# Patient Record
Sex: MALE | Race: WHITE
[De-identification: names, ages, dates, MRNs, and addresses within clinical notes are randomized per-mention and may not be internally consistent; named-entity substitution may affect disease eponyms.]

---

## 2018-03-29 ENCOUNTER — HOSPITAL ENCOUNTER (EMERGENCY)
Dept: HOSPITAL 17 - NEPK | Age: 23
Discharge: HOME | End: 2018-03-29
Payer: COMMERCIAL

## 2018-03-29 VITALS — HEIGHT: 68 IN | BODY MASS INDEX: 23.39 KG/M2 | WEIGHT: 154.32 LBS

## 2018-03-29 VITALS
SYSTOLIC BLOOD PRESSURE: 133 MMHG | DIASTOLIC BLOOD PRESSURE: 65 MMHG | RESPIRATION RATE: 15 BRPM | HEART RATE: 76 BPM | OXYGEN SATURATION: 100 % | TEMPERATURE: 98.1 F

## 2018-03-29 DIAGNOSIS — S59.911A: Primary | ICD-10-CM
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
## 2018-03-29 PROCEDURE — 73090 X-RAY EXAM OF FOREARM: CPT

## 2018-03-29 PROCEDURE — 99283 EMERGENCY DEPT VISIT LOW MDM: CPT

## 2018-03-29 NOTE — RADRPT
EXAM DATE/TIME:  03/29/2018 10:09 

 

[facility] COMPARISON:     

No previous studies available for comparison.

 

                     

INDICATIONS :     

Fall. Right lateral arm pain.

                     

 

MEDICAL HISTORY :     

None.          

 

SURGICAL HISTORY :     

None.   

 

ENCOUNTER:     

Initial                                        

 

ACUITY:     

1 day      

 

PAIN SCORE:     

10/10

 

LOCATION:     

Right lateral forearm

 

FINDINGS:     

Two view examination of the right forearm demonstrates no evidence of fracture or dislocation.  Bony 
mineralization is normal.  The soft tissue structures are intact.

 

CONCLUSION:     Unremarkable exam.

 

 

 

 Artem Marin MD on March 29, 2018 at 10:33           

Board Certified Radiologist.

 This report was verified electronically.

## 2018-03-29 NOTE — PD
HPI


Chief Complaint:  Injury


Time Seen by Provider:  09:48


Travel History


International Travel<30 days:  No


Contact w/Intl Traveler<30days:  No


Traveled to known affect area:  No





History of Present Illness


HPI


22-year-old male presents to the emergency department with complaint of right 

forearm pain since today after tripping over dirt and his whole right side 

landing on a wheelbarrow injuring his right forearm today.  He denies hitting 

his head or loss of consciousness.  Denies neck pain or back pain.  Denies 

chest pain, shortness of breath, abdominal pain, vomiting.  Denies 

lightheadedness, dizziness, headache, focal deficits or weakness.  Denies other 

extremity pain.  Denies paresthesias, loss of sensation to the affected 

extremity.  Reports decreased range of motion secondary to pain.  Reports 

swelling to the forearm.  Has not taken any medications to alleviate his 

symptoms.  Has applied a cold ice bottle to the arm for symptom management.  

Rates pain 10/10.  Describes it as a throbbing and pressure.  Worse with 

movement and palpation.  Better at rest.  No primary care provider.  No known 

allergies.  Denies significant past medical history.  Has no other medical 

complaints.  No other modifying factors or associated signs and symptoms.





[facility]


Social History


Tobacco Use:  No





Allergies-Medications


(Allergen,Severity, Reaction):  


Coded Allergies:  


     No Known Allergies (Unverified , 3/29/18)


Reported Meds & Prescriptions





Reported Meds & Active Scripts


Active


Ibuprofen 800 Mg Tab 800 Mg PO Q6HR PRN








Review of Systems


Except as stated in HPI:  all other systems reviewed are Neg





Physical Exam


Narrative


GENERAL: Well-nourished, well-developed  male patient, in no acute 

distress


SKIN: Warm and dry.


HEAD: Atraumatic. Normocephalic. 


EYES: Pupils equal and round. No scleral icterus. No injection or drainage.


ENT: Mucosa pink and moist.  Airway patent.  


NECK: Trachea midline.  


CARDIOVASCULAR: Regular rate.


RESPIRATORY: No accessory muscle use.


GASTROINTESTINAL: Flat.


MUSCULOSKELETAL: Right proximal forearm with tenderness to palpation with edema 

when compared to the left forearm; tenderness on palpation to the distal 

forearm also; no obvious deformity.  Right upper extremity is supple and 

nontender with 2+ radial pulse and sensory intact and without erythema. No 

obvious deformities. No clubbing.  No cyanosis.  No edema. 


NEUROLOGICAL: Awake and alert.  Oriented 3.  No obvious cranial nerve 

deficits.  Motor grossly within normal limits. Normal speech. 


PSYCHIATRIC: Appropriate mood and affect; insight and judgment normal.





Data


Data


Last Documented VS





Vital Signs








  Date Time  Temp Pulse Resp B/P (MAP) Pulse Ox O2 Delivery O2 Flow Rate FiO2


 


3/29/18 08:53 98.1 76 15 133/65 (87) 100   








Orders





 Orders


Forearm (2vws) (3/29/18 09:53)


Ice/Cold Pack (3/29/18 09:53)


Acetamin-Hydrocod 325-5 Mg (Norco  5-325 (3/29/18 10:00)


Sling Cradle Arm (3/29/18 )


Splint Or Brace Apply/Monitor (3/29/18 10:42)


Ed Discharge Order (3/29/18 10:42)








[facility]


Medical Decision Making


Medical Screen Exam Complete:  Yes


Emergency Medical Condition:  Yes


Medical Record Reviewed:  Yes


Differential Diagnosis


Fall, contusion, fracture, sprain


Narrative Course


22-year-old male with right forearm injury after mechanical fall.  Denies 

hitting his head or loss consciousness.  Denies neck pain or back pain.  This 

is a work-related injury.  Norco, ice packs, right forearm x-ray ordered.


Right forearm x-ray concludes:  











Radius/Ulna X-Ray 3/29/18 0953 Signed





Impressions: 





 Service Date/Time:  Thursday, March 29, 2018 10:09 - CONCLUSION: Unremarkable 





 exam.     Artem Marin MD 





X-ray findings discussed with the patient.  Ace bandage and arm sling provided 

for support.  Ibuprofen prescribed for home.  Instructed patient to follow up 

with primary care provider.  Patient verbalizes understanding and agreement 

with treatment plan.  Patient is medically cleared and stable for discharge.  

Discussed reasons to return to the emergency department.  Patient agrees with 

treatment plan.  The patients vital signs are stable and the patient is stable 

for outpatient follow-up and treatment.  Patient discharged home, stable and in 

no acute distress.





Diagnosis





 Primary Impression:  


 Fall


 Qualified Codes:  W19.XXXA - Unspecified fall, initial encounter


 Additional Impression:  


 Forearm injury


 Qualified Codes:  S59.911A - Unspecified injury of right forearm, initial 

encounter


Referrals:  


[facility]





Primary Care Physician


Patient Instructions:  Contusion in Adults (ED), General Instructions, Muscle 

Strain (ED)





***Additional Instructions:  


Tylenol or ibuprofen as directed and as needed to reduce pain


Rest, ice, compress, and elevate extremity to decrease pain and inflammation


Ace wrap for support


Arm sling as needed for support


Avoid aggravating activity; increase activity as tolerated


Follow-up with primary care provider


Return to the emergency department immediately with worsening symptoms


***Med/Other Pt SpecificInfo:  Prescription(s) given


Scripts


Ibuprofen (Ibuprofen) 800 Mg Tab


800 MG PO Q6HR Y for PAIN, #30 TAB 0 Refills


   Prov: Marina Devine         3/29/18


Disposition:  01 DISCHARGE HOME


Condition:  Stable











Marina Devine Mar 29, 2018 10:42